# Patient Record
Sex: MALE | Race: WHITE | NOT HISPANIC OR LATINO | Employment: FULL TIME | URBAN - NONMETROPOLITAN AREA
[De-identification: names, ages, dates, MRNs, and addresses within clinical notes are randomized per-mention and may not be internally consistent; named-entity substitution may affect disease eponyms.]

---

## 2021-04-08 ENCOUNTER — OFFICE VISIT (OUTPATIENT)
Dept: URGENT CARE | Facility: CLINIC | Age: 26
End: 2021-04-08
Payer: COMMERCIAL

## 2021-04-08 ENCOUNTER — APPOINTMENT (OUTPATIENT)
Dept: RADIOLOGY | Facility: IMAGING CENTER | Age: 26
End: 2021-04-08
Attending: STUDENT IN AN ORGANIZED HEALTH CARE EDUCATION/TRAINING PROGRAM
Payer: COMMERCIAL

## 2021-04-08 VITALS
HEART RATE: 58 BPM | OXYGEN SATURATION: 96 % | TEMPERATURE: 98.4 F | DIASTOLIC BLOOD PRESSURE: 60 MMHG | HEIGHT: 67 IN | BODY MASS INDEX: 22.13 KG/M2 | SYSTOLIC BLOOD PRESSURE: 124 MMHG | WEIGHT: 141 LBS

## 2021-04-08 DIAGNOSIS — M79.641 RIGHT HAND PAIN: ICD-10-CM

## 2021-04-08 DIAGNOSIS — S62.339A CLOSED BOXER'S FRACTURE, INITIAL ENCOUNTER: ICD-10-CM

## 2021-04-08 PROCEDURE — 73130 X-RAY EXAM OF HAND: CPT | Mod: TC,RT | Performed by: STUDENT IN AN ORGANIZED HEALTH CARE EDUCATION/TRAINING PROGRAM

## 2021-04-08 PROCEDURE — 99204 OFFICE O/P NEW MOD 45 MIN: CPT | Mod: 57 | Performed by: STUDENT IN AN ORGANIZED HEALTH CARE EDUCATION/TRAINING PROGRAM

## 2021-04-08 PROCEDURE — 26605 TREAT METACARPAL FRACTURE: CPT | Mod: 54,RT | Performed by: STUDENT IN AN ORGANIZED HEALTH CARE EDUCATION/TRAINING PROGRAM

## 2021-04-08 PROCEDURE — 73130 X-RAY EXAM OF HAND: CPT | Mod: TC,76,RT | Performed by: STUDENT IN AN ORGANIZED HEALTH CARE EDUCATION/TRAINING PROGRAM

## 2021-04-08 NOTE — PROGRESS NOTES
"He punchedSubjective:   CHIEF COMPLAINT  Chief Complaint   Patient presents with   • Hand Injury     (R) hand swollen x last night;punched a wall; pain when attempt to extend fingers         HPI  King Atwood is a 26 y.o. male who presents with chief complaint of right hand pain.  Patient reports last night and subsequently experiencing pain and swelling along the ulnar margins of his right hand.  There is also mild bruising.  He tried ibuprofen 800 mg which provided nominal relief of symptoms.  No additional history of prior trauma or surgery to his hand or wrist.    REVIEW OF SYSTEMS  General: no fever or chills  GI: no nausea or vomiting  See HPI for further details.    PAST MEDICAL HISTORY       SURGICAL HISTORY  patient denies any surgical history    ALLERGIES  No Known Allergies    CURRENT MEDICATIONS  Home Medications     Reviewed by Joshua Tinajero D.O. (Physician) on 04/08/21 at 1604  Med List Status: <None>   Medication Last Dose Status        Patient Attila Taking any Medications                       SOCIAL HISTORY  Social History     Tobacco Use   • Smoking status: Never Smoker   • Smokeless tobacco: Never Used   Substance and Sexual Activity   • Alcohol use: Not Currently   • Drug use: Yes     Types: Marijuana, Inhaled   • Sexual activity: Not on file       FAMILY HISTORY  No family history on file.       Objective:   PHYSICAL EXAM  VITAL SIGNS: /60 (BP Location: Left arm, Patient Position: Sitting)   Pulse (!) 58   Temp 36.9 °C (98.4 °F) (Temporal)   Ht 1.702 m (5' 7\")   Wt 64 kg (141 lb)   SpO2 96%   BMI 22.08 kg/m²     Gen: no acute distress, normal voice  Skin: dry, intact, moist mucosal membranes  Lungs: CTAB w/ symmetric expansion  CV: RRR w/o murmurs or clicks  MSK: Right hand: Edema and mild ecchymosis along the dorsal and volar margins of the fourth and fifth metacarpals.  Full range of motion associate with mild discernible discomfort on the extreme ends of flexion.  TTP " along the fourth and fifth metacarpals; no crepitus.  No evidence of malrotation or pseudoclawing.  Distal sensation and motor intact.  Psych: normal affect, normal judgement, alert, awake      RADIOLOGY RESULTS   DX-HAND 3+ RIGHT    Result Date: 4/8/2021 4/8/2021 5:40 PM HISTORY/REASON FOR EXAM:  Pain/Deformity Following Trauma; post traction/reduction. RIGHT hand pain. TECHNIQUE/EXAM DESCRIPTION AND NUMBER OF VIEWS: views of the RIGHT hand. COMPARISON: 4/8/2021 4:28 PM FINDINGS: Artifact from splint limits exam. Distal 5th metacarpal fracture again seen with apex dorsal angulation.  Alignment is unchanged. Dorsal soft tissue swelling again seen.     1.  Alignment of RIGHT 5th metacarpal fracture appears unchanged. 2.  Artifact from splint limits exam.    DX-HAND 3+ RIGHT    Result Date: 4/8/2021 4/8/2021 4:20 PM HISTORY/REASON FOR EXAM:  Pain/Deformity Following Trauma; Punched a wall last night.  Pain and swelling localized to fourth and fifth metacarpals. TECHNIQUE/EXAM DESCRIPTION AND NUMBER OF VIEWS:  3 views of the RIGHT hand. COMPARISON: None FINDINGS: Dorsal soft tissue swelling over the metacarpals. Fracture at the distal aspect 5th metacarpal shaft with apex dorsal angulation. No dislocation. No radiopaque foreign body.     1.  Fracture of the distal RIGHT 5th metacarpal shaft with apex dorsal angulation and overlying soft tissue swelling. 2.  No dislocation.       I ordered and personally reviewed and discussed the imaging with the patient.      Assessment/Plan:     1. Right hand pain  DX-HAND 3+ RIGHT    DX-HAND 3+ RIGHT    REFERRAL TO ORTHOPEDICS   2. Closed boxer's fracture, initial encounter     Multiple views of the right hand demonstrated a displaced fracture at the neck of the fifth metacarpal with approximately 40 degrees of volar angulation.  Manual reduction was attempted by applying dorsal pressure at the shaft of the flexed proximal phalanx with counter force applied just proximal to the  fracture site on the dorsum of the metacarpal.  The patient tolerated the procedure well without any complications.  Repeat x-rays were taken which demonstrated a few degrees of improvement with angulation and shortening.  -Placed in gutter splint  -Referral sent to orthopedic hand/upper extremity  -Ibuprofen 800 mg 3 times daily and/or Tylenol 1000 mg 3 times daily as needed pain; offered stronger medications but patient declined  -Discussed red flags and return precautions.  The patient verbalized his understanding of everything discussed.  All questions were answered.      Differential diagnosis, natural history, supportive care, and indications for immediate follow-up discussed. All questions answered. Patient agrees with the plan of care.    Follow-up as needed if symptoms worsen or fail to improve to PCP, Urgent care or Emergency Room.    Greater than 40 minutes was spent on this encounter including face-to-face time, discussing the diagnosis, medical management, follow-up, emergency room precautions and charting.     Please note that this dictation was created using voice recognition software. I have made a reasonable attempt to correct obvious errors, but I expect that there are errors of grammar and possibly content that I did not discover before finalizing the note.

## 2024-04-14 ENCOUNTER — OFFICE VISIT (OUTPATIENT)
Dept: URGENT CARE | Facility: CLINIC | Age: 29
End: 2024-04-14

## 2024-04-14 VITALS
WEIGHT: 150 LBS | DIASTOLIC BLOOD PRESSURE: 70 MMHG | HEART RATE: 68 BPM | TEMPERATURE: 98 F | BODY MASS INDEX: 23.54 KG/M2 | OXYGEN SATURATION: 98 % | RESPIRATION RATE: 18 BRPM | SYSTOLIC BLOOD PRESSURE: 115 MMHG | HEIGHT: 67 IN

## 2024-04-14 DIAGNOSIS — R11.2 NAUSEA AND VOMITING, UNSPECIFIED VOMITING TYPE: ICD-10-CM

## 2024-04-14 PROCEDURE — 99203 OFFICE O/P NEW LOW 30 MIN: CPT

## 2024-04-14 PROCEDURE — 3074F SYST BP LT 130 MM HG: CPT

## 2024-04-14 PROCEDURE — 3078F DIAST BP <80 MM HG: CPT

## 2024-04-14 ASSESSMENT — VISUAL ACUITY: OU: 1

## 2024-04-14 ASSESSMENT — ENCOUNTER SYMPTOMS
SHORTNESS OF BREATH: 0
DIZZINESS: 0
FLANK PAIN: 0
SINUS PAIN: 0
COUGH: 0
SPUTUM PRODUCTION: 0
WHEEZING: 0
WEAKNESS: 0
SORE THROAT: 0
ABDOMINAL PAIN: 0
NECK PAIN: 0
BLURRED VISION: 0
STRIDOR: 0
DIARRHEA: 0
FEVER: 0
NAUSEA: 0
VOMITING: 0
HEADACHES: 0
MYALGIAS: 0
CHILLS: 0

## 2024-04-14 NOTE — PROGRESS NOTES
"Subjective     King Atwood is a 29 y.o. male who presents for a work note.     HPI:   King is a 30yo male presenting for a work note to return to work post 2 days of nausea and vomiting. Reports last emesis episode 2 days ago. Denies any current symptoms or concerns. No dizziness, fatigue, abdominal pain, or diarrhea. Denies fever or shortness of breath.      Review of Systems   Constitutional:  Negative for chills, fever and malaise/fatigue.   HENT:  Negative for congestion, ear discharge, ear pain, sinus pain and sore throat.    Eyes:  Negative for blurred vision.   Respiratory:  Negative for cough, sputum production, shortness of breath, wheezing and stridor.    Gastrointestinal:  Negative for abdominal pain, diarrhea, nausea and vomiting.   Genitourinary:  Negative for dysuria and flank pain.   Musculoskeletal:  Negative for myalgias and neck pain.   Neurological:  Negative for dizziness, weakness and headaches.     Patient has no known allergies.     Social History     Tobacco Use    Smoking status: Never    Smokeless tobacco: Never   Vaping Use    Vaping Use: Never used   Substance Use Topics    Alcohol use: Not Currently    Drug use: Yes     Types: Marijuana, Inhaled      Medications, Allergies, and current problem list reviewed today in Epic.      Objective     /70 (BP Location: Left arm, Patient Position: Sitting, BP Cuff Size: Adult)   Pulse 68   Temp 36.7 °C (98 °F) (Temporal)   Resp 18   Ht 1.702 m (5' 7\")   Wt 68 kg (150 lb)   SpO2 98%   BMI 23.49 kg/m²      Physical Exam  Vitals reviewed.   Constitutional:       General: He is not in acute distress.  HENT:      Nose: Nose normal.   Eyes:      General: Vision grossly intact. Gaze aligned appropriately. No visual field deficit.     Extraocular Movements: Extraocular movements intact.      Conjunctiva/sclera: Conjunctivae normal.      Pupils: Pupils are equal, round, and reactive to light.   Cardiovascular:      Rate and Rhythm: " Normal rate and regular rhythm.      Pulses: Normal pulses.      Heart sounds: Normal heart sounds.   Pulmonary:      Effort: Pulmonary effort is normal. No tachypnea, accessory muscle usage, prolonged expiration, respiratory distress or retractions.      Breath sounds: Normal breath sounds.   Abdominal:      General: Abdomen is flat. Bowel sounds are normal. There is no distension.      Palpations: Abdomen is soft. There is no hepatomegaly, splenomegaly or mass.      Tenderness: There is no abdominal tenderness. There is no right CVA tenderness, left CVA tenderness, guarding or rebound.      Hernia: No hernia is present.   Musculoskeletal:      Cervical back: Full passive range of motion without pain, normal range of motion and neck supple. No rigidity. Normal range of motion.   Skin:     General: Skin is warm and dry.   Neurological:      Mental Status: He is alert. Mental status is at baseline.   Psychiatric:         Mood and Affect: Mood normal.         Behavior: Behavior normal.         Thought Content: Thought content normal.       Assessment & Plan     1. Nausea and vomiting, unspecified vomiting type      MDM/Comments:   Patient with resolved nausea and vomiting. Denies abdominal pain. No current symptoms or concerns. Patient is clinically stable in clinic with normal vital signs. Patient safe to return to work.   Work note provided.      Follow-up as needed if symptoms worsen or fail to improve to PCP, Urgent care or Emergency Room.                   Electronically signed by IGOR Gil

## 2024-04-14 NOTE — LETTER
[facility]  [address]  [place] [address]     April 14, 2024    Patient: King Atwood   YOB: 1995   Date of Visit: 4/14/2024       To Whom It May Concern:    King Atwood was seen and treated in our department on 4/14/2024.     King has been medically evaluated and may return to work beginning 4/14/24.           Sincerely,     RICKY Oswald.

## 2024-07-23 ENCOUNTER — OCCUPATIONAL MEDICINE (OUTPATIENT)
Dept: URGENT CARE | Facility: CLINIC | Age: 29
End: 2024-07-23
Payer: COMMERCIAL

## 2024-07-23 ENCOUNTER — NON-PROVIDER VISIT (OUTPATIENT)
Dept: URGENT CARE | Facility: CLINIC | Age: 29
End: 2024-07-23

## 2024-07-23 VITALS
HEART RATE: 66 BPM | WEIGHT: 142 LBS | OXYGEN SATURATION: 100 % | RESPIRATION RATE: 16 BRPM | BODY MASS INDEX: 22.29 KG/M2 | DIASTOLIC BLOOD PRESSURE: 88 MMHG | TEMPERATURE: 97.7 F | HEIGHT: 67 IN | SYSTOLIC BLOOD PRESSURE: 130 MMHG
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
## 2024-07-23 DIAGNOSIS — S46.911A MUSCLE STRAIN OF RIGHT SCAPULAR REGION, INITIAL ENCOUNTER: ICD-10-CM

## 2024-07-23 LAB
AMP AMPHETAMINE: NORMAL
BREATH ALCOHOL COMMENT: NORMAL
COC COCAINE: NORMAL
INT CON NEG: NORMAL
INT CON POS: NORMAL
MET METHAMPHETAMINES: NORMAL
OPI OPIATES: NORMAL
PCP PHENCYCLIDINE: NORMAL
POC BREATHALIZER: 0 PERCENT (ref 0–0.01)
POC DRUG COMMENT 753798-OCCUPATIONAL HEALTH: NORMAL
THC: NORMAL

## 2024-07-23 PROCEDURE — 99203 OFFICE O/P NEW LOW 30 MIN: CPT | Performed by: FAMILY MEDICINE

## 2024-07-23 PROCEDURE — 3079F DIAST BP 80-89 MM HG: CPT | Performed by: FAMILY MEDICINE

## 2024-07-23 PROCEDURE — 82075 ASSAY OF BREATH ETHANOL: CPT | Performed by: PHYSICIAN ASSISTANT

## 2024-07-23 PROCEDURE — 99000 SPECIMEN HANDLING OFFICE-LAB: CPT | Performed by: PHYSICIAN ASSISTANT

## 2024-07-23 PROCEDURE — 80305 DRUG TEST PRSMV DIR OPT OBS: CPT | Performed by: PHYSICIAN ASSISTANT

## 2024-07-23 PROCEDURE — 3075F SYST BP GE 130 - 139MM HG: CPT | Performed by: FAMILY MEDICINE

## 2024-07-23 PROCEDURE — 8279 PR URINE 6 PANEL - SEND TO LAB: Performed by: PHYSICIAN ASSISTANT

## 2024-07-23 RX ORDER — CYCLOBENZAPRINE HCL 10 MG
10 TABLET ORAL 3 TIMES DAILY PRN
Qty: 30 TABLET | Refills: 0 | Status: SHIPPED | OUTPATIENT
Start: 2024-07-23

## 2024-07-23 RX ORDER — NAPROXEN 500 MG/1
500 TABLET ORAL 2 TIMES DAILY WITH MEALS
Qty: 60 TABLET | Refills: 0 | Status: SHIPPED | OUTPATIENT
Start: 2024-07-23

## 2024-07-23 ASSESSMENT — ENCOUNTER SYMPTOMS
CARDIOVASCULAR NEGATIVE: 1
RESPIRATORY NEGATIVE: 1
GASTROINTESTINAL NEGATIVE: 1
BACK PAIN: 1
EYES NEGATIVE: 1
CONSTITUTIONAL NEGATIVE: 1

## 2024-07-31 ENCOUNTER — OFFICE VISIT (OUTPATIENT)
Dept: URGENT CARE | Facility: CLINIC | Age: 29
End: 2024-07-31

## 2024-07-31 ENCOUNTER — APPOINTMENT (OUTPATIENT)
Dept: URGENT CARE | Facility: CLINIC | Age: 29
End: 2024-07-31
Payer: COMMERCIAL

## 2024-07-31 VITALS
HEART RATE: 66 BPM | OXYGEN SATURATION: 96 % | DIASTOLIC BLOOD PRESSURE: 78 MMHG | SYSTOLIC BLOOD PRESSURE: 110 MMHG | TEMPERATURE: 98.1 F | RESPIRATION RATE: 20 BRPM | BODY MASS INDEX: 22.29 KG/M2 | WEIGHT: 142 LBS | HEIGHT: 67 IN

## 2024-07-31 DIAGNOSIS — S29.019A THORACIC MYOFASCIAL STRAIN, INITIAL ENCOUNTER: ICD-10-CM
